# Patient Record
Sex: FEMALE | Race: OTHER | NOT HISPANIC OR LATINO | URBAN - METROPOLITAN AREA
[De-identification: names, ages, dates, MRNs, and addresses within clinical notes are randomized per-mention and may not be internally consistent; named-entity substitution may affect disease eponyms.]

---

## 2018-07-03 ENCOUNTER — OUTPATIENT (OUTPATIENT)
Dept: OUTPATIENT SERVICES | Facility: HOSPITAL | Age: 43
LOS: 1 days | End: 2018-07-03
Payer: COMMERCIAL

## 2018-07-03 VITALS
DIASTOLIC BLOOD PRESSURE: 78 MMHG | OXYGEN SATURATION: 98 % | RESPIRATION RATE: 16 BRPM | HEART RATE: 68 BPM | SYSTOLIC BLOOD PRESSURE: 110 MMHG | TEMPERATURE: 98 F | HEIGHT: 62 IN | WEIGHT: 128.97 LBS

## 2018-07-03 DIAGNOSIS — N84.0 POLYP OF CORPUS UTERI: ICD-10-CM

## 2018-07-03 DIAGNOSIS — Z01.818 ENCOUNTER FOR OTHER PREPROCEDURAL EXAMINATION: ICD-10-CM

## 2018-07-03 DIAGNOSIS — N94.89 OTHER SPECIFIED CONDITIONS ASSOCIATED WITH FEMALE GENITAL ORGANS AND MENSTRUAL CYCLE: ICD-10-CM

## 2018-07-03 DIAGNOSIS — S86.019A STRAIN OF UNSPECIFIED ACHILLES TENDON, INITIAL ENCOUNTER: Chronic | ICD-10-CM

## 2018-07-03 DIAGNOSIS — N92.1 EXCESSIVE AND FREQUENT MENSTRUATION WITH IRREGULAR CYCLE: ICD-10-CM

## 2018-07-03 LAB
HCT VFR BLD CALC: 37.9 % — SIGNIFICANT CHANGE UP (ref 34.5–45)
HGB BLD-MCNC: 12.2 G/DL — SIGNIFICANT CHANGE UP (ref 11.5–15.5)
MCHC RBC-ENTMCNC: 30.8 PG — SIGNIFICANT CHANGE UP (ref 27–34)
MCHC RBC-ENTMCNC: 32.2 GM/DL — SIGNIFICANT CHANGE UP (ref 32–36)
MCV RBC AUTO: 95.7 FL — SIGNIFICANT CHANGE UP (ref 80–100)
PLATELET # BLD AUTO: 224 K/UL — SIGNIFICANT CHANGE UP (ref 150–400)
RBC # BLD: 3.96 M/UL — SIGNIFICANT CHANGE UP (ref 3.8–5.2)
RBC # FLD: 11.7 % — SIGNIFICANT CHANGE UP (ref 10.3–14.5)
WBC # BLD: 6.05 K/UL — SIGNIFICANT CHANGE UP (ref 3.8–10.5)
WBC # FLD AUTO: 6.05 K/UL — SIGNIFICANT CHANGE UP (ref 3.8–10.5)

## 2018-07-03 PROCEDURE — 85027 COMPLETE CBC AUTOMATED: CPT

## 2018-07-03 PROCEDURE — G0463: CPT

## 2018-07-03 RX ORDER — SODIUM CHLORIDE 9 MG/ML
3 INJECTION INTRAMUSCULAR; INTRAVENOUS; SUBCUTANEOUS EVERY 8 HOURS
Qty: 0 | Refills: 0 | Status: DISCONTINUED | OUTPATIENT
Start: 2018-07-12 | End: 2018-07-27

## 2018-07-03 RX ORDER — LIDOCAINE HCL 20 MG/ML
0.2 VIAL (ML) INJECTION ONCE
Qty: 0 | Refills: 0 | Status: DISCONTINUED | OUTPATIENT
Start: 2018-07-12 | End: 2018-07-27

## 2018-07-03 NOTE — H&P PST ADULT - ATTENDING COMMENTS
This 42 y/o AF here for management of DUB noted to have an endometrial polyp; endometrial biopsy confirmed, here for diagnostic hysteroscopy, dilitationa nd curettage and polypectomy This 42 y/o AF here for management of DUB noted to have an endometrial polyp; endometrial biopsy confirmed, here for diagnostic hysteroscopy, dilitation and curettage and polypectomy

## 2018-07-03 NOTE — H&P PST ADULT - NSANTHOSAYNRD_GEN_A_CORE
No. CHRISTIANNE screening performed.  STOP BANG Legend: 0-2 = LOW Risk; 3-4 = INTERMEDIATE Risk; 5-8 = HIGH Risk

## 2018-07-03 NOTE — H&P PST ADULT - HISTORY OF PRESENT ILLNESS
42 yo female, reports she's preparing for pregnancy and gyn exam and sonogram revealed uterine polyps. Pt. presents to PST today for scheduled D&C, Diagnostic Hysteroscopy, Polypectomy on 7/12/18. Pt. denies recent fever, chills, chest pain, SOB, changes in bowel/urinary habits.

## 2018-07-11 ENCOUNTER — TRANSCRIPTION ENCOUNTER (OUTPATIENT)
Age: 43
End: 2018-07-11

## 2018-07-12 ENCOUNTER — RESULT REVIEW (OUTPATIENT)
Age: 43
End: 2018-07-12

## 2018-07-12 ENCOUNTER — OUTPATIENT (OUTPATIENT)
Dept: OUTPATIENT SERVICES | Facility: HOSPITAL | Age: 43
LOS: 1 days | End: 2018-07-12
Payer: COMMERCIAL

## 2018-07-12 VITALS
SYSTOLIC BLOOD PRESSURE: 132 MMHG | RESPIRATION RATE: 17 BRPM | HEART RATE: 63 BPM | OXYGEN SATURATION: 100 % | DIASTOLIC BLOOD PRESSURE: 81 MMHG

## 2018-07-12 VITALS
RESPIRATION RATE: 18 BRPM | WEIGHT: 128.97 LBS | HEART RATE: 59 BPM | SYSTOLIC BLOOD PRESSURE: 115 MMHG | OXYGEN SATURATION: 100 % | TEMPERATURE: 98 F | DIASTOLIC BLOOD PRESSURE: 73 MMHG | HEIGHT: 62 IN

## 2018-07-12 DIAGNOSIS — N84.0 POLYP OF CORPUS UTERI: ICD-10-CM

## 2018-07-12 DIAGNOSIS — S86.019A STRAIN OF UNSPECIFIED ACHILLES TENDON, INITIAL ENCOUNTER: Chronic | ICD-10-CM

## 2018-07-12 DIAGNOSIS — N92.1 EXCESSIVE AND FREQUENT MENSTRUATION WITH IRREGULAR CYCLE: ICD-10-CM

## 2018-07-12 DIAGNOSIS — Z01.818 ENCOUNTER FOR OTHER PREPROCEDURAL EXAMINATION: ICD-10-CM

## 2018-07-12 DIAGNOSIS — N94.89 OTHER SPECIFIED CONDITIONS ASSOCIATED WITH FEMALE GENITAL ORGANS AND MENSTRUAL CYCLE: ICD-10-CM

## 2018-07-12 PROCEDURE — 58558 HYSTEROSCOPY BIOPSY: CPT

## 2018-07-12 PROCEDURE — 88305 TISSUE EXAM BY PATHOLOGIST: CPT

## 2018-07-12 PROCEDURE — 88305 TISSUE EXAM BY PATHOLOGIST: CPT | Mod: 26

## 2018-07-12 RX ORDER — ONDANSETRON 8 MG/1
4 TABLET, FILM COATED ORAL ONCE
Qty: 0 | Refills: 0 | Status: DISCONTINUED | OUTPATIENT
Start: 2018-07-12 | End: 2018-07-27

## 2018-07-12 RX ORDER — APREPITANT 80 MG/1
40 CAPSULE ORAL ONCE
Qty: 0 | Refills: 0 | Status: COMPLETED | OUTPATIENT
Start: 2018-07-12 | End: 2018-07-12

## 2018-07-12 RX ORDER — SODIUM CHLORIDE 9 MG/ML
1000 INJECTION, SOLUTION INTRAVENOUS
Qty: 0 | Refills: 0 | Status: DISCONTINUED | OUTPATIENT
Start: 2018-07-12 | End: 2018-07-27

## 2018-07-12 RX ORDER — HYDROMORPHONE HYDROCHLORIDE 2 MG/ML
0.25 INJECTION INTRAMUSCULAR; INTRAVENOUS; SUBCUTANEOUS
Qty: 0 | Refills: 0 | Status: DISCONTINUED | OUTPATIENT
Start: 2018-07-12 | End: 2018-07-12

## 2018-07-12 RX ORDER — OXYCODONE HYDROCHLORIDE 5 MG/1
5 TABLET ORAL ONCE
Qty: 0 | Refills: 0 | Status: DISCONTINUED | OUTPATIENT
Start: 2018-07-12 | End: 2018-07-12

## 2018-07-12 RX ORDER — CLINDAMYCIN PHOSPHATE GEL USP, 1% 10 MG/G
1 GEL TOPICAL
Qty: 0 | Refills: 0 | COMMUNITY

## 2018-07-12 RX ORDER — ALBUTEROL 90 UG/1
2 AEROSOL, METERED ORAL
Qty: 0 | Refills: 0 | COMMUNITY

## 2018-07-12 RX ORDER — ACETAMINOPHEN 500 MG
1000 TABLET ORAL ONCE
Qty: 0 | Refills: 0 | Status: DISCONTINUED | OUTPATIENT
Start: 2018-07-12 | End: 2018-07-27

## 2018-07-12 RX ADMIN — APREPITANT 40 MILLIGRAM(S): 80 CAPSULE ORAL at 07:52

## 2018-07-12 NOTE — BRIEF OPERATIVE NOTE - PROCEDURE
<<-----Click on this checkbox to enter Procedure Polypectomy  07/12/2018    Active  [email or web address]  D&C (dilatation and curettage)  07/12/2018    Active  [email or web address]  Diagnostic hysteroscopy  07/12/2018    Active  [email or web address]

## 2018-07-12 NOTE — BRIEF OPERATIVE NOTE - PRE-OP DX
Excessive and frequent menstruation with irregular cycle  07/12/2018    Active  Chris Dacosta  Menorrhagia  07/12/2018    Active  Chris Dacosta

## 2018-07-12 NOTE — ASU DISCHARGE PLAN (ADULT/PEDIATRIC). - NOTIFY
Excessive Diarrhea/GYN Fever>100.4/Pain not relieved by Medications/Swelling that continues/Numbness, tingling/Unable to Urinate/Numbness, color, or temperature change to extremity/Bleeding that does not stop/Increased Irritability or Sluggishness/Inability to Tolerate Liquids or Foods/Persistent Nausea and Vomiting

## 2018-07-17 LAB — SURGICAL PATHOLOGY STUDY: SIGNIFICANT CHANGE UP

## 2020-01-08 NOTE — H&P PST ADULT - COMFORT LEVEL, ACCEPTABLE
4 Implemented All Universal Safety Interventions:  [place] to call system. Call bell, personal items and telephone within reach. Instruct patient to call for assistance. Room bathroom lighting operational. Non-slip footwear when patient is off stretcher. Physically safe environment: no spills, clutter or unnecessary equipment. Stretcher in lowest position, wheels locked, appropriate side rails in place.

## 2021-06-15 PROBLEM — J45.909 UNSPECIFIED ASTHMA, UNCOMPLICATED: Chronic | Status: ACTIVE | Noted: 2018-07-03

## 2021-06-15 PROBLEM — N84.0 POLYP OF CORPUS UTERI: Chronic | Status: ACTIVE | Noted: 2018-07-03

## 2021-07-02 PROBLEM — Z00.00 ENCOUNTER FOR PREVENTIVE HEALTH EXAMINATION: Status: ACTIVE | Noted: 2021-07-02

## 2021-07-06 ENCOUNTER — APPOINTMENT (OUTPATIENT)
Dept: PEDIATRIC ALLERGY IMMUNOLOGY | Facility: CLINIC | Age: 46
End: 2021-07-06
Payer: COMMERCIAL

## 2021-07-06 DIAGNOSIS — R00.1 BRADYCARDIA, UNSPECIFIED: ICD-10-CM

## 2021-07-06 DIAGNOSIS — G43.909 MIGRAINE, UNSPECIFIED, NOT INTRACTABLE, W/OUT STATUS MIGRAINOSUS: ICD-10-CM

## 2021-07-06 PROCEDURE — 99205 OFFICE O/P NEW HI 60 MIN: CPT | Mod: 95

## 2021-07-06 RX ORDER — PREDNISONE 20 MG/1
20 TABLET ORAL
Qty: 10 | Refills: 0 | Status: COMPLETED | COMMUNITY
Start: 2021-06-16

## 2021-07-06 RX ORDER — AZELASTINE HYDROCHLORIDE 137 UG/1
0.1 SPRAY, METERED NASAL
Qty: 30 | Refills: 0 | Status: ACTIVE | COMMUNITY
Start: 2021-06-21

## 2021-07-06 RX ORDER — AMOXICILLIN 875 MG/1
875 TABLET, FILM COATED ORAL
Qty: 24 | Refills: 0 | Status: COMPLETED | COMMUNITY
Start: 2021-02-07

## 2021-07-06 RX ORDER — IPRATROPIUM BROMIDE AND ALBUTEROL SULFATE 2.5; .5 MG/3ML; MG/3ML
0.5-2.5 (3) SOLUTION RESPIRATORY (INHALATION)
Qty: 360 | Refills: 0 | Status: ACTIVE | COMMUNITY
Start: 2021-02-25

## 2021-07-06 RX ORDER — FLUTICASONE PROPIONATE 50 UG/1
50 SPRAY, METERED NASAL
Qty: 16 | Refills: 0 | Status: ACTIVE | COMMUNITY
Start: 2021-06-21

## 2021-07-06 NOTE — CONSULT LETTER
[Dear  ___] : Dear  [unfilled], [Consult Letter:] : I had the pleasure of evaluating your patient, [unfilled]. [Please see my note below.] : Please see my note below. [This report is provisional, pending the completion of the evaluation.  A final diagnosis and plan will follow.] : This report is provisional, pending the completion of the evaluation.  A final diagnosis and plan will follow. [Consult Closing:] : Thank you very much for allowing me to participate in the care of this patient.  If you have any questions, please do not hesitate to contact me. [Sincerely,] : Sincerely, [FreeTextEntry2] : SHAY DAVE [FreeTextEntry3] : Radha Graves MD\par Attending Physician, Allergy and Immunology\par , [facility] [place] [facility]\par Department of Medicine and Pediatrics\par [facility]/Division of Allergy and Immunology\par \par  [DrFaye  ___] : Dr. WHITMORE

## 2021-07-06 NOTE — REVIEW OF SYSTEMS
[Immunizations are up to date] : Immunizations are up to date [Received Influenza Vaccine this Past Year] : patient has received the Influenza vaccine this past year [Rhinorrhea] : rhinorrhea [Sneezing] : sneezing [Nl] : Genitourinary [FreeTextEntry5] : bradycardia  [FreeTextEntry6] : asthma [FreeTextEntry7] : GERD ; stomach issues - maybe irritable bowel syndrome - not consistently.  [FreeTextEntry8] : migraines

## 2021-07-06 NOTE — SOCIAL HISTORY
[House] : [unfilled] lives in a house  [Radiator/Baseboard] : heating provided by radiator(s)/baseboard(s) [Window Units] : air conditioning provided by window units [Cockroaches] : Patient states that there are cockroaches in the home [Bedroom] :  in bedroom [Living Area] : in living area [None] : none [Dust Mite Covers] : does not have dust mite covers [Feather Pillows] : does not have feather pillows [Feather Comforter] : does not have a feather comforter [de-identified] : 2nd floor  [de-identified] : no rodents

## 2021-07-06 NOTE — HISTORY OF PRESENT ILLNESS
[Home] : at home, [unfilled] , at the time of the visit. [Other Location: e.g. Home (Enter Location, City,State)___] : at [unfilled] [Verbal consent obtained from patient] : the patient, [unfilled] [de-identified] : Verbal consent given on 07/06/2021 at 10:50 AM  by  EMELIA MACEDO . \par \par Emelia is a 45 yo female with allergic rhinoconjunctivitis, asthma, NSAID allergy, with an allergic reaction to the COVID19 vaccine, presenting for an initial consultation. \par \par VACCINE REACTION \par December 24, did first dose of Pfizer. A few minutes afterwards, had facial pruritus, ear redness, face redness, face felt hot couldn't breath, BP and HR went high. No angioedema.  Went to ED where they give her Benadryl. Sx improved with Benadryl. Discharged her after 4-5 hours. Felt unwell afterwards. Body aches, feverish and headaches. Scared to get the second dose of vaccine, but wants to get protected.  Went to Dr. Kylie Sutton who referred her to us. \par Has never taken MiraLAX. Never had colonoscopy. \par \par 1. Did you receive the Moderna or Pfizer vaccine? \par Pfizer\par \par 2. Do you have a history of a severe allergic reaction to an injectable medication (IV, IM, SC)? \par no\par \par 3. Do you have a history of a severe allergic reaction to a prior vaccine? \par no\par \par 4. Do you have a history of a severe allergic reaction to another allergen? \par no\par \par a. Oral medication \par no\par \par b. Food \par no\par \par c. IV Contrast \par Never had\par \par d. Stinging insect \par never had \par \par e. Latex \par no\par \par 5. Do you have a history of a non-severe allergic reaction to another allergen? \par \par a. Oral medication \par Celebrex \par \par b. Food \par has a hx of shrimp and squid but over time has resolved over time. sx were swelling of ears, no rashes, vomiting. feels hot.\par \par c. IV contrast \par no\par \par d. Stinging insect \par no\par \par e. Latex  \par no\par \par 6. Do you have a history of an immediate (< 4 hours) or severe allergic reaction to polyethylene glycol (PEG) containing injectable or vaccine? \par yes, Pfizer #1\par \par 7. Do you have a history of an immediate (< 4 hours) or severe allergic reaction to polysorbate containing injectable or vaccine? \par no\par \par 8.Do you have a history of an immediate (< 4 hours) or severe allergic reaction to polyoxyl 35 castor oil (e.g. paclitaxel) containing injectable or vaccine? \par no\par \par 9. Do you have a history of the following:  \par \par  a. environmental allergies/hay fever??? \par yes, seasonal \par \par b. Asthma?? \par since 2018, hasn't had sx. coughing and sob, with change of weather would feel chest heaviness. would take albuterol, symbicort. hasn't needed for three years.\par \par c. eczema or?atopic dermatitis \par no\par \par d. contact dermatitis \par no\par \par e. hives?(urticaria)?or angioedema \par no\par \par f. Mast cell disorder \par no\par \par g. Eosinophilic gastrointestinal disorder \par no\par \par h. Autoimmune disease \par hx of thyroid dysfxn - had been on Synthroid, but not currently\par \par i. Cancer/malignancy \par no\par \par j. GERD\par does have reflux  - not treated \par  \par 10. Do you have any?history of any other allergic reaction of any kind??? \par Celebrex, Pfizer vaccine\par \par 11. Have you ever had a colonoscopy???When was your last colonoscopy? \par no\par \par 12. Have you had a reaction to preparation for colonoscopy?  When?  \par no\par \par 13. Have you ever used?Miralax?(polyethylene glycol) or another PEG containing laxative?? When was your last use? \par no\par \par 14. Do you have any cosmetic fillers? When was the last procedure? \par no\par \par 15. Were you infected with?SARS-COV2?to your knowledge?(Have you ever had COVID-19)??? \par no\par \par 16. Have you received passive antibody therapy (monoclonal antibodies or convalescent serum) as treatment for COVID-19?? \par no\par \par 17. Do you take a beta-blocker??? \par no\par \par 18. Do you take an ACE inhibitor?? \par no\par \par 19. Do you use NSAIDs?? Did you take NSAIDs within 24 hours of your vaccine?? Y/N. If yes, before or after? \par no\par \par 20. Did you drink alcohol within 24 hours of the reaction? Y/N.  If yes, before or after? \par no\par \par 21. Did you partake in strenuous exercise within 24 hours of the reaction? Y/N. If yes, before or after? \par no\par \par 22. Do you have a history of vasovagal reactions (fainting after blood draw, shots or other situations)??? \par In June, after biking for three hours, did feel like she was going to faint. Had cardiac work up, all normal, bradycardic.\par \par 23. Do you have a history of anxiety or panic attacks??? \par no\par \par 24. 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 Do you work with or have a hobby working with automobiles? \par no\par \par 26. Any other known exposure to polyethylene glycol? \par no\par \par 27. What was the timing of your reaction? How soon did you feel any symptoms??? \par less than 5 min\par 28. What were your symptoms?after receiving the COVID-19 vaccine?in chronological order??? \par facial itching, then feeling hot, redness of ears, palpitations \par Did you feel any:?? \par \par Itchiness? \par yes\par Rash/urticaria? \par yes\par flushing \par yes, face\par Swelling? \par no\par Difficulty swallowing/lump in throat? \par throat felt like it was getting tighter. gave O2 - couldn't breath - felt like that she had to take off mask. O2 sat not checked. \par \par Transient dyspnea (less than 5 minutes) \par no, her symptoms lasted  more than 5 minutes\par \par Shortness of breath/wheezing/cough?? \par SOB, no wheezing. no coughing\par \par Abdominal pain/cramping/vomiting/diarrhea? \par no \par \par Dizziness/Lightheadedness/Fainting/Loss of Consciousness? \par no\par \par Impending sense of doom? \par yes with sob\par \par Tachycardia/Palpitations \par yes\par \par Metallic taste \par no\par \par Lip tingling/numbness \par no\par \par Hypertension  \par yes\par \par What was highest level of care received for vaccine reaction? \par ED\par What medications were you treated with??? \par \par Benadryl (or H1 blocker); which one?? \par yes, Benadryl\par \par Pepcid (or another H2 blocker); which one?? \par no\par Steroids (PO/IV/IM); which one?? \par no\par Montelukast? \par no\par Epinephrine? \par no\par Albuterol/FREEDOM \par no\par Inhaled corticosteroid \par no\par \par tylenol for headache \par \par Physician assessment of reaction	 \par \par Anaphylaxis \par Meets NIAID criteria for anaphylaxis, but not by Ronnie\par \par DRUG ALLERGY\par With Celebrex, developed small hives and rashes immediately after the dose. Didn't take any thing., improved on it's own. No SOB or angioedema.\par No allergic reactions to vaccines. Has taken flu shot. \par \par ALLERGIC RHINOCONJUNCTIVITIS\par Seasonal doesn't take meds\par \par ASTHMA\par Hx of shortness of breath with seasonal changes, but has not had sx in 3 years.

## 2021-07-14 ENCOUNTER — APPOINTMENT (OUTPATIENT)
Dept: PEDIATRIC ALLERGY IMMUNOLOGY | Facility: CLINIC | Age: 46
End: 2021-07-14
Payer: COMMERCIAL

## 2021-07-14 VITALS — WEIGHT: 144.8 LBS | HEART RATE: 69 BPM

## 2021-07-14 DIAGNOSIS — T78.40XA ALLERGY, UNSPECIFIED, INITIAL ENCOUNTER: ICD-10-CM

## 2021-07-14 PROCEDURE — 99214 OFFICE O/P EST MOD 30 MIN: CPT | Mod: 25

## 2021-07-14 PROCEDURE — 36415 COLL VENOUS BLD VENIPUNCTURE: CPT

## 2021-07-14 PROCEDURE — 95018 ALL TSTG PERQ&IQ DRUGS/BIOL: CPT

## 2021-07-14 RX ADMIN — CETIRIZINE HYDROCHLORIDE 0 MG: 10 TABLET, FILM COATED ORAL at 00:00

## 2021-07-14 RX ADMIN — Medication 0 MG: at 00:00

## 2021-07-14 NOTE — CONSULT LETTER
[Dear  ___] : Dear  [unfilled], [Consult Letter:] : I had the pleasure of evaluating your patient, [unfilled]. [Please see my note below.] : Please see my note below. [This report is provisional, pending the completion of the evaluation.  A final diagnosis and plan will follow.] : This report is provisional, pending the completion of the evaluation.  A final diagnosis and plan will follow. [Consult Closing:] : Thank you very much for allowing me to participate in the care of this patient.  If you have any questions, please do not hesitate to contact me. [Sincerely,] : Sincerely, [FreeTextEntry2] :  SHAY DAVE  [FreeTextEntry3] : Radha Graves MD\par Attending Physician, Allergy and Immunology\par , [facility]\par Department of Medicine and Pediatrics\par [facility]/Division of Allergy and Immunology\par \par  [DrFaye  ___] : Dr. WHITMORE

## 2021-07-14 NOTE — REASON FOR VISIT
[Initial Consultation] : an initial consultation for [FreeTextEntry2] : allergic reaction to covid19 vaccine

## 2021-07-14 NOTE — IMPRESSION
[FreeTextEntry1] : PEG SPT 1:10 negative\par PEG undiluted SPT POSITIVE\par Methylprednisolone acetate intradermal (PEG containing) POSITIVE\par Methylprednisolone succinate intradermal  POSITIVE [FreeTextEntry2] : During intradermal skin testing pt developed tightness in her upper throat (8/10), with cough, and headache. BP elevated, Sat and HR normal. Treated with Zyrtec 10mg and Tyleneol 625mg with gradual improvement of sx.

## 2021-07-14 NOTE — HISTORY OF PRESENT ILLNESS
[de-identified] : Debra is a 47 yo female with asthma, allergic rhinoconjunctivitis and drug allergy who had  an allergic reaction to her first COVID19 vaccine (Pfizer). She developed flushing, facial pruritus and shortness of breath. Her BP and HR were elevated. She responded to Benadryl. She is here today for testing for PEG.  [> or = 20] : > than or = 20 [FreeTextEntry7] : 24

## 2021-07-15 ENCOUNTER — APPOINTMENT (OUTPATIENT)
Dept: PEDIATRIC ALLERGY IMMUNOLOGY | Facility: CLINIC | Age: 46
End: 2021-07-15

## 2021-07-19 ENCOUNTER — APPOINTMENT (OUTPATIENT)
Dept: PEDIATRIC ALLERGY IMMUNOLOGY | Facility: CLINIC | Age: 46
End: 2021-07-19
Payer: COMMERCIAL

## 2021-07-19 ENCOUNTER — OUTPATIENT (OUTPATIENT)
Dept: OUTPATIENT SERVICES | Facility: HOSPITAL | Age: 46
LOS: 1 days | End: 2021-07-19
Payer: COMMERCIAL

## 2021-07-19 ENCOUNTER — NON-APPOINTMENT (OUTPATIENT)
Age: 46
End: 2021-07-19

## 2021-07-19 ENCOUNTER — APPOINTMENT (OUTPATIENT)
Dept: RADIOLOGY | Facility: CLINIC | Age: 46
End: 2021-07-19
Payer: COMMERCIAL

## 2021-07-19 VITALS — WEIGHT: 146.39 LBS | OXYGEN SATURATION: 98 % | BODY MASS INDEX: 26.94 KG/M2 | HEIGHT: 62 IN | HEART RATE: 68 BPM

## 2021-07-19 DIAGNOSIS — J45.20 MILD INTERMITTENT ASTHMA, UNCOMPLICATED: ICD-10-CM

## 2021-07-19 DIAGNOSIS — S86.019A STRAIN OF UNSPECIFIED ACHILLES TENDON, INITIAL ENCOUNTER: Chronic | ICD-10-CM

## 2021-07-19 LAB — TRYPTASE: <1 UG/L

## 2021-07-19 PROCEDURE — 95012 NITRIC OXIDE EXP GAS DETER: CPT

## 2021-07-19 PROCEDURE — 71046 X-RAY EXAM CHEST 2 VIEWS: CPT

## 2021-07-19 PROCEDURE — 94060 EVALUATION OF WHEEZING: CPT

## 2021-07-19 PROCEDURE — 99072 ADDL SUPL MATRL&STAF TM PHE: CPT

## 2021-07-19 PROCEDURE — 71046 X-RAY EXAM CHEST 2 VIEWS: CPT | Mod: 26

## 2021-07-22 ENCOUNTER — APPOINTMENT (OUTPATIENT)
Dept: PEDIATRIC ALLERGY IMMUNOLOGY | Facility: CLINIC | Age: 46
End: 2021-07-22
Payer: COMMERCIAL

## 2021-07-22 VITALS
TEMPERATURE: 96.3 F | HEART RATE: 77 BPM | WEIGHT: 144 LBS | DIASTOLIC BLOOD PRESSURE: 87 MMHG | OXYGEN SATURATION: 94 % | HEIGHT: 62 IN | SYSTOLIC BLOOD PRESSURE: 129 MMHG | BODY MASS INDEX: 26.5 KG/M2

## 2021-07-22 DIAGNOSIS — T50.905D ADVERSE EFFECT OF UNSPECIFIED DRUGS, MEDICAMENTS AND BIOLOGICAL SUBSTANCES, SUBSEQUENT ENCOUNTER: ICD-10-CM

## 2021-07-22 DIAGNOSIS — T80.62XA OTHER SERUM REACTION DUE TO VACCINATION, INITIAL ENCOUNTER: ICD-10-CM

## 2021-07-22 DIAGNOSIS — T50.B95A OTHER SERUM REACTION DUE TO VACCINATION, INITIAL ENCOUNTER: ICD-10-CM

## 2021-07-22 DIAGNOSIS — J30.2 OTHER SEASONAL ALLERGIC RHINITIS: ICD-10-CM

## 2021-07-22 PROCEDURE — 0002A: CPT

## 2021-07-22 PROCEDURE — 99215 OFFICE O/P EST HI 40 MIN: CPT | Mod: 25

## 2021-07-22 RX ORDER — FLUTICASONE PROPIONATE 50 UG/1
50 SPRAY, METERED NASAL DAILY
Qty: 1 | Refills: 2 | Status: ACTIVE | COMMUNITY
Start: 2021-07-22 | End: 1900-01-01

## 2021-07-23 ENCOUNTER — TRANSCRIPTION ENCOUNTER (OUTPATIENT)
Age: 46
End: 2021-07-23

## 2021-07-23 PROBLEM — T50.905D ADVERSE EFFECT OF DRUG, SUBSEQUENT ENCOUNTER: Noted: 2021-07-22

## 2021-07-23 NOTE — REVIEW OF SYSTEMS
[Nasal Congestion] : nasal congestion [Nl] : Psychiatric [Fatigue] : no fatigue [Fever] : no fever [Post Nasal Drip] : no post nasal drip [Sneezing] : no sneezing

## 2021-07-23 NOTE — HISTORY OF PRESENT ILLNESS
[de-identified] : \par DALLAS MACEDO is a 46 year old female, who presents to high-risk COVID-19 vaccination clinic. for 2nd dose Pfizer-BioNTech COVID-19 Vaccine \par \par Since yesterday she feels pain in her nose. she noticed it when she was watering the plants, first - in the left nostril, then - on the right. She has a history of seasonal allergic symptoms. Otherwise, she feels well. \par - Premedications taken: symbicort, cetirizine , famotidine, albuterol\par \par Allergic history::\par - History of reactions to the first dose of mRNA vaccine\par ---  she developed  flushing, facial pruritus and shortness of breath within few min of receiving Pfizer-BioNTech COVID-19 Vaccine 12/24/20.  Her BP and HR were elevated. She responded to Benadryl\par Patient requested for vaccine to be administered under allergist's supervision.\par \par

## 2021-07-23 NOTE — PHYSICAL EXAM
[Alert] : alert [Well Nourished] : well nourished [Healthy Appearance] : healthy appearance [Sclera Not Icteric] : sclera not icteric [No Acute Distress] : no acute distress [Normal Lips/Tongue] : the lips and tongue were normal [No Thrush] : no thrush [Pale mucosa] : pale mucosa [Boggy Nasal Turbinates] : boggy and/or pale nasal turbinates [Posterior Pharyngeal Cobblestoning] : posterior pharyngeal cobblestoning [Normal Rate and Effort] : normal respiratory rhythm and effort [No Crackles] : no crackles [Bilateral Audible Breath Sounds] : bilateral audible breath sounds [Normal Rate] : heart rate was normal  [Regular Rhythm] : with a regular rhythm [Soft] : abdomen soft [Normal Cervical Lymph Nodes] : cervical [No Rash] : no rash [No Cyanosis] : no cyanosis [Normal Mood] : mood was normal [Normal Affect] : affect was normal [Alert, Awake, Oriented as Age-Appropriate] : alert, awake, oriented as age appropriate [Conjunctival Erythema] : no conjunctival erythema [Pharyngeal erythema] : no pharyngeal erythema [Exudate] : no exudate [Wheezing] : no wheezing was heard [Patches] : no patches

## 2021-07-29 ENCOUNTER — APPOINTMENT (OUTPATIENT)
Dept: PEDIATRIC ALLERGY IMMUNOLOGY | Facility: CLINIC | Age: 46
End: 2021-07-29
Payer: COMMERCIAL

## 2021-07-29 DIAGNOSIS — J45.20 MILD INTERMITTENT ASTHMA, UNCOMPLICATED: ICD-10-CM

## 2021-07-29 DIAGNOSIS — Z88.6 ALLERGY STATUS TO ANALGESIC AGENT: ICD-10-CM

## 2021-07-29 PROCEDURE — 99442: CPT

## 2021-07-29 RX ORDER — FAMOTIDINE 20 MG/1
20 TABLET, FILM COATED ORAL
Qty: 60 | Refills: 3 | Status: DISCONTINUED | COMMUNITY
Start: 2021-07-14 | End: 2021-07-29

## 2021-07-29 RX ORDER — ALBUTEROL SULFATE 90 UG/1
108 (90 BASE) INHALANT RESPIRATORY (INHALATION)
Qty: 8 | Refills: 0 | Status: ACTIVE | COMMUNITY
Start: 2021-02-25

## 2021-07-29 RX ORDER — CETIRIZINE HYDROCHLORIDE 10 MG/1
10 TABLET, FILM COATED ORAL
Qty: 30 | Refills: 3 | Status: ACTIVE | COMMUNITY
Start: 2021-07-14

## 2021-07-29 RX ORDER — BUDESONIDE AND FORMOTEROL FUMARATE DIHYDRATE 160; 4.5 UG/1; UG/1
160-4.5 AEROSOL RESPIRATORY (INHALATION) TWICE DAILY
Qty: 1 | Refills: 3 | Status: ACTIVE | COMMUNITY
Start: 2021-07-14

## 2021-07-29 NOTE — HISTORY OF PRESENT ILLNESS
[Home] : at home, [unfilled] , at the time of the visit. [Other Location: e.g. Home (Enter Location, City,State)___] : at [unfilled] [Verbal consent obtained from patient] : the patient, [unfilled] [de-identified] : Verbal consent given on 07/29/2021 at 10:30 AM  by DALLAS MACEDO . \par  \par Debra is a 47 yo female with asthma, drug allergy, who developed an allergic reaction to her first Pfizer vaccine, and subsequently received her 2nd dose with premedication in our high risk COVID19 vaccine clinic, with transient non allergic sx. With her first dose, she had flushing, facial pruritus and shortness of breath but BP and HR were elevated. At f/u Skin testing,  which was positive for PEG, she developed throat tightness and headache, with elevated BP but other VSS. Symptoms improved with Zyrtec and Tylenol. It is unclear if her reaction is truly an IgE mediated reaction, but it has been consistent. \par \par With the second dose, she developed immediate transient mild nonallergic adverse effects, seen with mRNA vaccines (feeling of heat) with no other associated sx.  She was observed for 2.5 hours. \par \liane Currently feels fine.  Day after vaccination she developed body pain, lymph nodes in axillae and groin b/l were enlarged, but has subsided. \par Continues on Symbicort on Zyrtec every day. Feels like she feels good on these medicines. Doesn't feel SOB with exertion, has more energy, not coughing as much. \par \par Using Flonase only as needed. \par

## 2021-07-29 NOTE — REASON FOR VISIT
[Routine Follow-Up] : a routine follow-up visit for [FreeTextEntry2] : high risk COVID19 vaccination clinic, allergic reaction to covid19 vaccine, asthma, allergic rhinoconjunctivitis

## 2021-07-29 NOTE — CONSULT LETTER
[Dear  ___] : Dear ~YUDITH, [Courtesy Letter:] : I had the pleasure of seeing your patient, [unfilled], in my office today. [Please see my note below.] : Please see my note below. [Consult Closing:] : Thank you very much for allowing me to participate in the care of this patient.  If you have any questions, please do not hesitate to contact me. [Sincerely,] : Sincerely, [DrFaye  ___] : Dr. WHITMORE [FreeTextEntry2] : BREA QUISPE MD [FreeTextEntry3] : Radha Graves MD\par Attending Physician, Allergy and Immunology\par , [facility]\par Department of Medicine and Pediatrics\par [facility]/Division of Allergy and Immunology\par \par

## 2022-02-17 ENCOUNTER — APPOINTMENT (OUTPATIENT)
Dept: PEDIATRIC ALLERGY IMMUNOLOGY | Facility: CLINIC | Age: 47
End: 2022-02-17
Payer: COMMERCIAL

## 2022-02-17 VITALS
OXYGEN SATURATION: 97 % | DIASTOLIC BLOOD PRESSURE: 81 MMHG | TEMPERATURE: 96.98 F | HEART RATE: 90 BPM | SYSTOLIC BLOOD PRESSURE: 128 MMHG

## 2022-02-17 DIAGNOSIS — Z71.85 ENCOUNTER FOR IMMUNIZATION SAFETY COUNSELING: ICD-10-CM

## 2022-02-17 DIAGNOSIS — Z23 ENCOUNTER FOR IMMUNIZATION: ICD-10-CM

## 2022-02-17 DIAGNOSIS — T50.Z95D ADVERSE EFFECT OF OTHER VACCINES AND BIOLOGICAL SUBSTANCES, SUBSEQUENT ENCOUNTER: ICD-10-CM

## 2022-02-17 PROCEDURE — 0004A: CPT

## 2022-02-17 PROCEDURE — 99213 OFFICE O/P EST LOW 20 MIN: CPT | Mod: 25

## 2022-02-17 NOTE — END OF VISIT
[FreeTextEntry3] : I personally discussed this patient with THAI Tamayo at the time of the visit. I agree with the assessment and plan as written unless noted below. \par I was present with the PA during the key portions of the exam. I agree with the findings and plan as documented in the PA's note, unless noted below.\par I was present during the entire procedure and assisted the PA as needed.\par \par \par Pt with chronic asthma. Should f/u in office for asthma management. She is traveling and will do so when she returns.

## 2022-02-17 NOTE — REASON FOR VISIT
[Routine Follow-Up] : a routine follow-up visit for [FreeTextEntry2] : Pfizer mRna COVID 19 booster, asthma

## 2022-02-17 NOTE — HISTORY OF PRESENT ILLNESS
[de-identified] : Debra is a 46 yo female with asthma, drug allergy, who developed an allergic reaction to her first Pfizer vaccine, and subsequently received her 2nd dose with premedication in our high risk COVID19 vaccine clinic, with transient non allergic sx. Here for the Pfizer COVID 19 booster vaccine. \par \par -Currently feeling well. \par - Pre medicated  with cetirizine, famotine and Symbicort for one  week. \par - Took Albuterol an  hour ago.  two puff an hour ago.\par Denies any chest tightness, wheezing or shortness of breath. \par \par \par Reaction to First and Second COVID mRna pfizer vaccine:\par With her first dose, she had flushing, facial pruritus and shortness of breath but BP and HR were elevated. At f/u Skin testing, which was positive for PEG, she developed throat tightness and headache, with elevated BP but other VSS. Symptoms improved with Zyrtec and Tylenol. It is unclear if her reaction is truly an IgE mediated reaction, but it has been consistent. \par \par With the second dose, she developed immediate transient mild nonallergic adverse effects, seen with mRNA vaccines (feeling of heat) with no other associated sx. She was observed for 2.5 hours. Prior to the second vaccine was pre medicated with cetirizine and famotidine one week, Symbicort for one week. Albuterol two puff on the day of the vaccine. \par \par \par \par

## 2022-02-17 NOTE — REVIEW OF SYSTEMS
[Nl] : Integumentary [Fatigue] : no fatigue [Fever] : no fever [Eye Redness] : no redness [Puffy Eyelids] : no puffy ~T eyelids [Redness Of Eyelid] : no redness of ~T eyelid [Rhinorrhea] : no rhinorrhea [Nasal Congestion] : no nasal congestion [Snoring] : no snoring [Post Nasal Drip] : no post nasal drip [Sneezing] : no sneezing [Cough] : no cough [Wheezing Worsens With Exercise] : wheezing does not worsen with exercise [Wheezing] : no wheezing

## 2022-05-10 ENCOUNTER — NON-APPOINTMENT (OUTPATIENT)
Age: 47
End: 2022-05-10

## 2022-05-10 ENCOUNTER — APPOINTMENT (OUTPATIENT)
Dept: PULMONOLOGY | Facility: CLINIC | Age: 47
End: 2022-05-10
Payer: COMMERCIAL

## 2022-05-10 VITALS
TEMPERATURE: 207.68 F | BODY MASS INDEX: 26.5 KG/M2 | WEIGHT: 144 LBS | HEART RATE: 64 BPM | DIASTOLIC BLOOD PRESSURE: 85 MMHG | OXYGEN SATURATION: 7 % | HEIGHT: 62 IN | SYSTOLIC BLOOD PRESSURE: 131 MMHG

## 2022-05-10 DIAGNOSIS — J30.9 ALLERGIC RHINITIS, UNSPECIFIED: ICD-10-CM

## 2022-05-10 DIAGNOSIS — H10.10 ALLERGIC RHINITIS, UNSPECIFIED: ICD-10-CM

## 2022-05-10 DIAGNOSIS — J45.40 MODERATE PERSISTENT ASTHMA, UNCOMPLICATED: ICD-10-CM

## 2022-05-10 PROCEDURE — 71046 X-RAY EXAM CHEST 2 VIEWS: CPT

## 2022-05-10 PROCEDURE — 99204 OFFICE O/P NEW MOD 45 MIN: CPT | Mod: 25

## 2022-05-10 PROCEDURE — 94010 BREATHING CAPACITY TEST: CPT

## 2022-05-11 ENCOUNTER — TRANSCRIPTION ENCOUNTER (OUTPATIENT)
Age: 47
End: 2022-05-11

## 2022-05-11 LAB
COVID-19 NUCLEOCAPSID  GAM ANTIBODY INTERPRETATION: NEGATIVE
COVID-19 SPIKE DOMAIN ANTIBODY INTERPRETATION: POSITIVE
SARS-COV-2 AB SERPL IA-ACNC: >250 U/ML
SARS-COV-2 AB SERPL QL IA: 0.08 INDEX

## 2022-05-12 NOTE — ASSESSMENT
[FreeTextEntry1] : Increased respiratory symptoms associated with COVID vaccination.  Prolonged cough which is now improving had initial adverse reaction and then second adverse reaction which was monitored by allergy group.  She has an abnormal x-ray.  I was concerned she might actually of had COVID but her serology does not suggest that possibility.  For now she seems to be improving she should have a follow-up chest x-ray to document resolution of the findings if they do not resolve we will consider chest CT.\par \par Regardless of the outcome of the cough work-up need to consider management of future vaccination need.  This may depend on availability of non PEG COVID vaccines.  Alternatively 1 could consider prophylactic monoclonal antibody based on history of vaccination intolerance

## 2022-05-12 NOTE — HISTORY OF PRESENT ILLNESS
[TextBox_4] : Patient here for evaluation of persistent cough.  She developed a persistent cough after her COVID booster with a persistent cough and loss of voice and dysphonia.  This is been improving and is slowly resolving.  She reports having had a bad reaction to the first COVID-vaccine.  She then received her second dose with monitoring from allergy immunology and had a reaction nonetheless.  She needed to get a booster shot so she could continue working.  She developed a cough about 2 days after the vaccine it has been slowly improving.  No preceding history of lung disease\par \par Does have preceding history of allergies\par \par

## 2022-06-21 ENCOUNTER — APPOINTMENT (OUTPATIENT)
Dept: PULMONOLOGY | Facility: CLINIC | Age: 47
End: 2022-06-21
Payer: COMMERCIAL

## 2022-06-21 VITALS
TEMPERATURE: 208.4 F | OXYGEN SATURATION: 98 % | HEART RATE: 77 BPM | DIASTOLIC BLOOD PRESSURE: 80 MMHG | SYSTOLIC BLOOD PRESSURE: 116 MMHG

## 2022-06-21 DIAGNOSIS — R93.89 ABNORMAL FINDINGS ON DIAGNOSTIC IMAGING OF OTHER SPECIFIED BODY STRUCTURES: ICD-10-CM

## 2022-06-21 PROCEDURE — 71046 X-RAY EXAM CHEST 2 VIEWS: CPT

## 2022-06-21 PROCEDURE — 99213 OFFICE O/P EST LOW 20 MIN: CPT | Mod: 25

## 2022-06-23 PROBLEM — R93.89 ABNORMAL CHEST X-RAY: Status: ACTIVE | Noted: 2022-05-12

## 2022-06-23 NOTE — ASSESSMENT
[FreeTextEntry1] : Cough and abnormal chest x-ray possibly as manifestation of reaction to COVID-vaccine.  X-ray improved and patient clinically well.  Did discuss potential option of Novavax vaccine as it is not similar to the current RNA based vaccines as an option should she should require revaccination.

## 2022-06-23 NOTE — HISTORY OF PRESENT ILLNESS
[TextBox_4] : Follow-up for cough and abnormal chest x-ray no new complaints not coughing not short of breath.  Here for follow-up chest x-ray

## 2022-07-01 ENCOUNTER — NON-APPOINTMENT (OUTPATIENT)
Age: 47
End: 2022-07-01

## 2022-07-01 ENCOUNTER — APPOINTMENT (OUTPATIENT)
Dept: ENDOCRINOLOGY | Facility: CLINIC | Age: 47
End: 2022-07-01

## 2022-07-01 VITALS
DIASTOLIC BLOOD PRESSURE: 82 MMHG | SYSTOLIC BLOOD PRESSURE: 130 MMHG | HEIGHT: 62 IN | WEIGHT: 141 LBS | BODY MASS INDEX: 25.95 KG/M2

## 2022-07-01 DIAGNOSIS — Z78.9 OTHER SPECIFIED HEALTH STATUS: ICD-10-CM

## 2022-07-01 DIAGNOSIS — Z80.9 FAMILY HISTORY OF MALIGNANT NEOPLASM, UNSPECIFIED: ICD-10-CM

## 2022-07-01 DIAGNOSIS — Z82.49 FAMILY HISTORY OF ISCHEMIC HEART DISEASE AND OTHER DISEASES OF THE CIRCULATORY SYSTEM: ICD-10-CM

## 2022-07-01 DIAGNOSIS — Z83.3 FAMILY HISTORY OF DIABETES MELLITUS: ICD-10-CM

## 2022-07-01 PROCEDURE — 36415 COLL VENOUS BLD VENIPUNCTURE: CPT

## 2022-07-01 PROCEDURE — 99204 OFFICE O/P NEW MOD 45 MIN: CPT | Mod: 25

## 2022-07-06 LAB
T4 FREE SERPL-MCNC: 1.7 NG/DL
THYROPEROXIDASE AB SERPL IA-ACNC: 13.9 IU/ML
TSH RECEPTOR AB: <1.1 IU/L
TSH SERPL-ACNC: 1.01 UIU/ML
TSI ACT/NOR SER: <0.1 IU/L

## 2022-07-19 NOTE — PHYSICAL EXAM
[Alert] : alert [No Acute Distress] : no acute distress [Normal Voice/Communication] : normal voice communication [Normal Hearing] : hearing was normal [Supple] : the neck was supple [No Respiratory Distress] : no respiratory distress [No Accessory Muscle Use] : no accessory muscle use [Normal Rate and Effort] : normal respiratory rate and effort [Clear to Auscultation] : lungs were clear to auscultation bilaterally [Normal S1, S2] : normal S1 and S2 [No Murmurs] : no murmurs [Normal Rate] : heart rate was normal [Regular Rhythm] : with a regular rhythm [No Edema] : no peripheral edema [Normal Bowel Sounds] : normal bowel sounds [Not Tender] : non-tender [Not Distended] : not distended [Soft] : abdomen soft [Normal Supraclavicular Nodes] : no supraclavicular lymphadenopathy [Normal Anterior Cervical Nodes] : no anterior cervical lymphadenopathy [Normal Posterior Cervical Nodes] : no posterior cervical lymphadenopathy [No Stigmata of Cushings Syndrome] : no stigmata of Cushings Syndrome [Normal Gait] : normal gait [No Clubbing, Cyanosis] : no clubbing  or cyanosis of the fingernails [No Tremors] : no tremors [No Involuntary Movements] : no involuntary movements were seen [Oriented x3] : oriented to person, place, and time [Normal Insight/Judgement] : insight and judgment were intact [de-identified] : thryomegaly 50g R>L, nontender to palpation
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
## 2022-09-07 ENCOUNTER — APPOINTMENT (OUTPATIENT)
Dept: ENDOCRINOLOGY | Facility: CLINIC | Age: 47
End: 2022-09-07

## 2022-09-07 VITALS
OXYGEN SATURATION: 97 % | BODY MASS INDEX: 25.83 KG/M2 | SYSTOLIC BLOOD PRESSURE: 129 MMHG | HEIGHT: 62 IN | WEIGHT: 140.38 LBS | RESPIRATION RATE: 16 BRPM | HEART RATE: 64 BPM | DIASTOLIC BLOOD PRESSURE: 85 MMHG

## 2022-09-07 PROCEDURE — 99213 OFFICE O/P EST LOW 20 MIN: CPT
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
## 2022-09-07 NOTE — PHYSICAL EXAM
[Alert] : alert [No Acute Distress] : no acute distress [Normal Voice/Communication] : normal voice communication [Normal Hearing] : hearing was normal [Supple] : the neck was supple [No Respiratory Distress] : no respiratory distress [No Accessory Muscle Use] : no accessory muscle use [Normal Rate and Effort] : normal respiratory rate and effort [Clear to Auscultation] : lungs were clear to auscultation bilaterally [Normal S1, S2] : normal S1 and S2 [No Murmurs] : no murmurs [Normal Rate] : heart rate was normal [Regular Rhythm] : with a regular rhythm [No Edema] : no peripheral edema [Normal Bowel Sounds] : normal bowel sounds [Not Tender] : non-tender [Not Distended] : not distended [Soft] : abdomen soft [Normal Supraclavicular Nodes] : no supraclavicular lymphadenopathy [Normal Anterior Cervical Nodes] : no anterior cervical lymphadenopathy [No Stigmata of Cushings Syndrome] : no stigmata of Cushings Syndrome [Normal Gait] : normal gait [No Clubbing, Cyanosis] : no clubbing  or cyanosis of the fingernails [No Involuntary Movements] : no involuntary movements were seen [No Tremors] : no tremors [Oriented x3] : oriented to person, place, and time [Normal Insight/Judgement] : insight and judgment were intact [de-identified] : thryomegaly 50g R>L, nontender to palpation

## 2023-02-22 ENCOUNTER — APPOINTMENT (OUTPATIENT)
Dept: ENDOCRINOLOGY | Facility: CLINIC | Age: 48
End: 2023-02-22
Payer: COMMERCIAL

## 2023-02-22 VITALS
HEART RATE: 67 BPM | SYSTOLIC BLOOD PRESSURE: 137 MMHG | OXYGEN SATURATION: 97 % | WEIGHT: 142 LBS | HEIGHT: 62 IN | DIASTOLIC BLOOD PRESSURE: 85 MMHG | BODY MASS INDEX: 26.13 KG/M2

## 2023-02-22 PROCEDURE — 99212 OFFICE O/P EST SF 10 MIN: CPT
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
## 2023-03-06 NOTE — PHYSICAL EXAM
[Alert] : alert [No Acute Distress] : no acute distress [Normal Voice/Communication] : normal voice communication [Normal Hearing] : hearing was normal [Supple] : the neck was supple [No Respiratory Distress] : no respiratory distress [No Accessory Muscle Use] : no accessory muscle use [Normal Rate and Effort] : normal respiratory rate and effort [Clear to Auscultation] : lungs were clear to auscultation bilaterally [Normal S1, S2] : normal S1 and S2 [No Murmurs] : no murmurs [Normal Rate] : heart rate was normal [Regular Rhythm] : with a regular rhythm [No Edema] : no peripheral edema [Normal Bowel Sounds] : normal bowel sounds [Not Tender] : non-tender [Not Distended] : not distended [Soft] : abdomen soft [Normal Supraclavicular Nodes] : no supraclavicular lymphadenopathy [Normal Anterior Cervical Nodes] : no anterior cervical lymphadenopathy [No Stigmata of Cushings Syndrome] : no stigmata of Cushings Syndrome [Normal Gait] : normal gait [No Clubbing, Cyanosis] : no clubbing  or cyanosis of the fingernails [No Involuntary Movements] : no involuntary movements were seen [No Tremors] : no tremors [Oriented x3] : oriented to person, place, and time [Normal Insight/Judgement] : insight and judgment were intact [de-identified] : thryomegaly 50g R>L, nontender to palpation

## 2023-08-09 ENCOUNTER — APPOINTMENT (OUTPATIENT)
Dept: ORTHOPEDIC SURGERY | Facility: CLINIC | Age: 48
End: 2023-08-09
Payer: OTHER MISCELLANEOUS

## 2023-08-09 ENCOUNTER — APPOINTMENT (OUTPATIENT)
Dept: MRI IMAGING | Facility: CLINIC | Age: 48
End: 2023-08-09
Payer: OTHER MISCELLANEOUS

## 2023-08-09 ENCOUNTER — NON-APPOINTMENT (OUTPATIENT)
Age: 48
End: 2023-08-09

## 2023-08-09 VITALS — WEIGHT: 142 LBS | HEIGHT: 62 IN | BODY MASS INDEX: 26.13 KG/M2

## 2023-08-09 DIAGNOSIS — M23.92 UNSPECIFIED INTERNAL DERANGEMENT OF LEFT KNEE: ICD-10-CM

## 2023-08-09 PROCEDURE — 73721 MRI JNT OF LWR EXTRE W/O DYE: CPT | Mod: LT

## 2023-08-09 PROCEDURE — 99204 OFFICE O/P NEW MOD 45 MIN: CPT | Mod: 25

## 2023-08-09 PROCEDURE — 73564 X-RAY EXAM KNEE 4 OR MORE: CPT | Mod: LT

## 2023-08-09 PROCEDURE — J3490M: CUSTOM

## 2023-08-09 PROCEDURE — L1833: CPT | Mod: LT

## 2023-08-09 PROCEDURE — 20610 DRAIN/INJ JOINT/BURSA W/O US: CPT | Mod: LT

## 2023-08-09 NOTE — ASSESSMENT
[FreeTextEntry1] :  Left X-Ray Examination of the KNEE (4 views): there are no fractures, subluxations or dislocations. Mild degenerative changes  Due to the patients mechanical symptoms along with medial joint line pain, effusion, and pos champ test on exam we will get an mri to eval for medial meniscus tear  - The patient was advised of the diagnosis.  The natural history of the pathology was explained to the patient in layman's terms.  Several different treatment options were discussed and explained including the risks and benefits of both surgical and non-surgical treatments. - The patient was advised to apply ice (wrapped in a towel or protective covering) to the area daily (20 minutes at a time, 2-4X/day). - We also discussed the possible of a corticosteroid injection in order to help decrease inflammation and pain so that they can perform better therapy and they wished to proceed with this treatment course.  - Hinged Knee brace in order to minimize buckling and instability  - The patient was advised to modify their activities. - f/u after mri

## 2023-08-09 NOTE — IMAGING
[de-identified] :  LEFT KNEE Inspection:  mild effusion Palpation: medial joint line tenderness  Knee Range of Motion:  0-130  Strength: 5/5 Quadriceps strength, 5/5 Hamstring strength Neurological: light touch is intact throughout Ligament Stability and Special Tests:  McMurrays: Positive Lachman: neg Pivot Shift: neg Posterior Drawer: neg Valgus: neg Varus: neg Patella Apprehension: neg Patella Maltracking: neg

## 2023-08-09 NOTE — HISTORY OF PRESENT ILLNESS
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 The pain is located  medial, lateral and anterior The pain is associated with clicking , catching, buckling Worse with stairs, walking and better at rest. Has tried icing, Motrin

## 2023-08-17 PROBLEM — M17.12 ARTHRITIS OF KNEE, LEFT: Status: ACTIVE | Noted: 2023-08-17

## 2023-08-21 ENCOUNTER — NON-APPOINTMENT (OUTPATIENT)
Age: 48
End: 2023-08-21

## 2023-08-21 ENCOUNTER — APPOINTMENT (OUTPATIENT)
Dept: ORTHOPEDIC SURGERY | Facility: CLINIC | Age: 48
End: 2023-08-21
Payer: OTHER MISCELLANEOUS

## 2023-08-21 DIAGNOSIS — M17.12 UNILATERAL PRIMARY OSTEOARTHRITIS, LEFT KNEE: ICD-10-CM

## 2023-08-21 PROCEDURE — 99214 OFFICE O/P EST MOD 30 MIN: CPT

## 2023-08-21 NOTE — ASSESSMENT
[FreeTextEntry1] : mri left knee 8/9/23 - pf deg with edea, synov, eff  exac of arthritis due to work injury.  - We discussed their diagnosis and treatment options at length including the risks and benefits of both surgical treatment with a knee replacement and non-surgical options. - We will continue conservative treatment with activity modification, PT, icing, weight loss, and anti-inflammatory medications. - The patient was provided with a PT prescription to work on ROM, hip ER/abductors strengthening, quad/hamstring stretches and strengthening, and other exercises  - The patient was advised to let pain guide the gradual advancement of activities.  - Follow up as needed in 6 weeks to re-evaluate, if no improvement we spoke about possibility of viscosupplementation injections
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
## 2023-08-21 NOTE — IMAGING
[de-identified] : LEFT KNEE Inspection:  mild effusion Palpation: medial joint line tenderness  Knee Range of Motion:  0-130  Strength: 5/5 Quadriceps strength, 5/5 Hamstring strength Neurological: light touch is intact throughout Ligament Stability and Special Tests:  McMurrays: neg Lachman: neg Pivot Shift: neg Posterior Drawer: neg Valgus: neg Varus: neg Patella Apprehension: neg Patella Maltracking: neg

## 2023-10-18 ENCOUNTER — APPOINTMENT (OUTPATIENT)
Dept: ENDOCRINOLOGY | Facility: CLINIC | Age: 48
End: 2023-10-18
Payer: COMMERCIAL

## 2023-10-18 VITALS
HEIGHT: 62 IN | DIASTOLIC BLOOD PRESSURE: 86 MMHG | OXYGEN SATURATION: 97 % | HEART RATE: 76 BPM | SYSTOLIC BLOOD PRESSURE: 134 MMHG | WEIGHT: 147 LBS | BODY MASS INDEX: 27.05 KG/M2

## 2023-10-18 PROCEDURE — 36415 COLL VENOUS BLD VENIPUNCTURE: CPT

## 2023-10-18 PROCEDURE — 99213 OFFICE O/P EST LOW 20 MIN: CPT | Mod: 25

## 2023-10-19 LAB
T4 FREE SERPL-MCNC: 1.6 NG/DL
TSH SERPL-ACNC: 0.97 UIU/ML

## 2024-04-17 ENCOUNTER — APPOINTMENT (OUTPATIENT)
Dept: ENDOCRINOLOGY | Facility: CLINIC | Age: 49
End: 2024-04-17
Payer: COMMERCIAL

## 2024-04-17 VITALS
HEIGHT: 62 IN | WEIGHT: 143 LBS | OXYGEN SATURATION: 97 % | HEART RATE: 86 BPM | BODY MASS INDEX: 26.31 KG/M2 | SYSTOLIC BLOOD PRESSURE: 125 MMHG | DIASTOLIC BLOOD PRESSURE: 83 MMHG

## 2024-04-17 DIAGNOSIS — E04.1 NONTOXIC SINGLE THYROID NODULE: ICD-10-CM

## 2024-04-17 PROCEDURE — 99213 OFFICE O/P EST LOW 20 MIN: CPT
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
## 2024-04-17 NOTE — PHYSICAL EXAM
[Alert] : alert [No Acute Distress] : no acute distress [Normal Voice/Communication] : normal voice communication [Normal Hearing] : hearing was normal [Supple] : the neck was supple [No Respiratory Distress] : no respiratory distress [No Accessory Muscle Use] : no accessory muscle use [Normal Rate and Effort] : normal respiratory rate and effort [Clear to Auscultation] : lungs were clear to auscultation bilaterally [Normal S1, S2] : normal S1 and S2 [No Murmurs] : no murmurs [Normal Rate] : heart rate was normal [Regular Rhythm] : with a regular rhythm [No Edema] : no peripheral edema [Normal Bowel Sounds] : normal bowel sounds [Not Tender] : non-tender [Not Distended] : not distended [Soft] : abdomen soft [Normal Supraclavicular Nodes] : no supraclavicular lymphadenopathy [Normal Anterior Cervical Nodes] : no anterior cervical lymphadenopathy [No Stigmata of Cushings Syndrome] : no stigmata of Cushings Syndrome [Normal Gait] : normal gait [No Clubbing, Cyanosis] : no clubbing  or cyanosis of the fingernails [No Involuntary Movements] : no involuntary movements were seen [No Tremors] : no tremors [Oriented x3] : oriented to person, place, and time [Normal Insight/Judgement] : insight and judgment were intact [de-identified] : thryomegaly 50g R>L, nontender to palpation
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
## 2024-10-16 ENCOUNTER — APPOINTMENT (OUTPATIENT)
Dept: ENDOCRINOLOGY | Facility: CLINIC | Age: 49
End: 2024-10-16
Payer: COMMERCIAL

## 2024-10-16 VITALS
HEART RATE: 72 BPM | HEIGHT: 62 IN | WEIGHT: 145 LBS | OXYGEN SATURATION: 99 % | SYSTOLIC BLOOD PRESSURE: 138 MMHG | DIASTOLIC BLOOD PRESSURE: 79 MMHG | BODY MASS INDEX: 26.68 KG/M2

## 2024-10-16 DIAGNOSIS — E04.1 NONTOXIC SINGLE THYROID NODULE: ICD-10-CM

## 2024-10-16 PROCEDURE — 36415 COLL VENOUS BLD VENIPUNCTURE: CPT

## 2024-10-16 PROCEDURE — 99213 OFFICE O/P EST LOW 20 MIN: CPT

## 2024-10-17 LAB
T4 FREE SERPL-MCNC: 1.6 NG/DL
TSH SERPL-ACNC: 0.83 UIU/ML

## 2025-07-15 ENCOUNTER — NON-APPOINTMENT (OUTPATIENT)
Age: 50
End: 2025-07-15

## 2025-07-16 ENCOUNTER — APPOINTMENT (OUTPATIENT)
Dept: ENDOCRINOLOGY | Facility: CLINIC | Age: 50
End: 2025-07-16
Payer: COMMERCIAL

## 2025-07-16 VITALS
BODY MASS INDEX: 26.87 KG/M2 | OXYGEN SATURATION: 99 % | DIASTOLIC BLOOD PRESSURE: 82 MMHG | HEIGHT: 62 IN | WEIGHT: 146 LBS | HEART RATE: 66 BPM | SYSTOLIC BLOOD PRESSURE: 126 MMHG

## 2025-07-16 PROCEDURE — 99213 OFFICE O/P EST LOW 20 MIN: CPT
